# Patient Record
Sex: MALE | Race: WHITE | NOT HISPANIC OR LATINO | RURAL
[De-identification: names, ages, dates, MRNs, and addresses within clinical notes are randomized per-mention and may not be internally consistent; named-entity substitution may affect disease eponyms.]

---

## 2024-11-13 ENCOUNTER — HOSPITAL ENCOUNTER (EMERGENCY)
Facility: HOSPITAL | Age: 34
Discharge: LEFT BEFORE DISCHARGE | End: 2024-11-13
Payer: COMMERCIAL

## 2024-11-13 VITALS
SYSTOLIC BLOOD PRESSURE: 129 MMHG | HEART RATE: 76 BPM | BODY MASS INDEX: 26.41 KG/M2 | OXYGEN SATURATION: 97 % | WEIGHT: 184.5 LBS | DIASTOLIC BLOOD PRESSURE: 60 MMHG | HEIGHT: 70 IN | RESPIRATION RATE: 20 BRPM | TEMPERATURE: 98 F

## 2024-11-13 DIAGNOSIS — R55 SYNCOPAL EPISODES: ICD-10-CM

## 2024-11-13 PROCEDURE — 99281 EMR DPT VST MAYX REQ PHY/QHP: CPT

## 2024-11-13 NOTE — LETTER
Patient: Wilver Hassan  YOB: 1990  Date: 11/13/2024 Time: 6:12 PM  Location: [facility]    Leaving the Hospital Against Medical Advice    Chart #:[id number]    This will certify that I, the undersigned,    ______________________________________________________________________    A patient in the above named medical center, having requested discharge and removal from the medical [place] against the advice of my attending physician(s), hereby release [facility], its physicians, officers and employees, severally and individually, from any and all liability of any nature whatsoever for any injury or harm or complication of any kind that may result directly or indirectly, by reason of my terminating my stay as a patient at [facility] and my departure from [facility], and hereby waive any and all rights of action I may now have or later acquire as a result of my voluntary departure from [facility] and the termination of my stay as a patient therein.    This release is made with the full knowledge of the danger that may result from the action which I am taking.      Date:_______________________                         ___________________________                                                                                    Patient/Legal Representative    Witness:        ____________________________                          ___________________________  Nurse                                                                        Physician

## 2024-11-14 ENCOUNTER — TELEPHONE (OUTPATIENT)
Dept: EMERGENCY MEDICINE | Facility: HOSPITAL | Age: 34
End: 2024-11-14
Payer: COMMERCIAL

## 2024-11-14 NOTE — ED NOTES
WENT INTO PATIENTS ROOM TO OBTAIN BLOODWORK AND URINE/SWABS PER PROVIDER REQUEST. PATIENT HAD JUST ARRIVED BACK FROM CT WHERE [employer] INFORMS ME HE HAD REFUSED CT HEAD BECAUSE HE DID NOT WANT TO BE CHARGED FOR THIS. HE REPORTS HE KNOWS WHY HE FELL ASLEEP AND DOES NOT WANT WORK UP DONE. INFORMED OF LABS, UA, SWABS, EKG ORDERED. PATIENT DENIED ALL TESTING. INFORMED PATIENT I WOULD INFORM PROVIDER OF DECISION. SPOKE TO NERI PALOMINO AND INFORMED OF REFUSAL AND DECISION FOR AMA WAS MADE. PATIENT PROVIDED AMA PAPERWORK AND DISCUSSED RISKS OF LEAVING AGAINST MEDICAL ADVICE. PATIENT VERBALIZED UNDERSTANDING, SIGNED AMA AND LEFT DEPARTMENT AT THIS TIME

## 2024-11-14 NOTE — ED PROVIDER NOTES
Encounter Date: 11/13/2024       History     Chief Complaint   Patient presents with    Loss of Consciousness     Pt states that he passed out In his vehicle and the police told him he either needed to come to the ER or he was going to detention     34-year-old male presents to ED with complaint of loss of consciousness.  Patient states that he was passed out in his vehicle due to being up prolonged period of time and driving from [place] to [place], [place] and back to [place] without sleep.  Patient states that he used heroin on today and reports that he has regular use of heroin but is attempting to decrease issues.  Patient states police came to his vehicle and stated he was either going to detention or to the hospital to be evaluated.  Patient states that he has taken off in December to go to rehab to help with detox.  Patient denies chest pain, shortness of breathe, weakness, dizziness.    The history is provided by the patient.     Review of patient's allergies indicates:  No Known Allergies  No past medical history on file.  No past surgical history on file.  No family history on file.     Review of Systems   Constitutional:  Negative for chills and fever.   HENT:  Negative for sinus pressure and sinus pain.    Eyes:  Negative for photophobia and visual disturbance.   Respiratory:  Negative for cough and shortness of breath.    Gastrointestinal:  Negative for nausea and vomiting.   Musculoskeletal:  Negative for arthralgias and gait problem.   Skin:  Negative for color change and wound.   Neurological:  Positive for dizziness and syncope.   Hematological:  Negative for adenopathy. Does not bruise/bleed easily.   Psychiatric/Behavioral:  Negative for agitation and confusion.    All other systems reviewed and are negative.      Physical Exam     Initial Vitals [11/13/24 1800]   BP Pulse Resp Temp SpO2   129/60 76 (!) 24 98.1 °F (36.7 °C) 97 %      MAP       --         Physical Exam    Nursing note and vitals  reviewed.  Constitutional: He appears well-developed and well-nourished.   HENT:   Head: Normocephalic and atraumatic.   Eyes: EOM are normal. Pupils are equal, round, and reactive to light.   Neck: Neck supple.   Normal range of motion.  Cardiovascular:  Normal rate and regular rhythm.           No murmur heard.  Pulmonary/Chest: He has no wheezes. He has no rhonchi.   Abdominal: Abdomen is soft. He exhibits no distension. There is no abdominal tenderness.   Musculoskeletal:         General: No tenderness or edema.      Cervical back: Normal range of motion and neck supple.     Lymphadenopathy:     He has no cervical adenopathy.   Neurological: He is alert and oriented to person, place, and time. No cranial nerve deficit or sensory deficit.   Skin: Skin is warm and dry. Capillary refill takes less than 2 seconds.   Psychiatric:   Tearful during examination.         Medical Screening Exam   See Full Note    ED Course   Procedures  Labs Reviewed - No data to display       Imaging Results    None          Medications - No data to display  Medical Decision Making  34-year-old male presents to ED with complaint of loss of consciousness.  Patient states that he was passed out in his vehicle due to being up prolonged period of time and driving from [place] to [place], [place] and back to [place] without sleep.  Patient states that he used heroin on today and reports that he has regular use of heroin but is attempting to decrease issues.  Patient states police came to his vehicle and stated he was either going to halfway or to the hospital to be evaluated.  Patient states that he has taken off in December to go to rehab to help with detox.  Patient denies chest pain, shortness of breathe, weakness, dizziness.        Patient reporting that he would like to find out why he had syncopal episode.  Labs, diagnostics ordered  Patient refused all labs and diagnostics.  Papers signed for discharge AMA                                       Clinical Impression:   Final diagnoses:  [R55] Syncopal episodes        ED Disposition Condition    Yazmin Motta, [facility]  11/13/24 [id number]

## 2024-11-14 NOTE — ED NOTES
The patient is clinically not intoxicated, free from distracting pain, appears to have intact insight, judgment and reason and in my clinical opinion has the capacity to make decisions.  The patient is also not under any duress to leave the hospital.  In this scenario, it would be battery to subject a patient to treatment against his/her will.  I have voiced my concerns for the patient's health given that a full treatment and evaluation had not occurred.  I have discussed the need for continued evaluation to determine if their symptoms are caused by a condition that present risk of death or morbidity.  Risks including but not limited to death, permanent disability, prolonged hospitalization, prolonged illness, were discussed.  I tried offering alternative options in hopes that the patient might be amenable to partial evaluation and treatment which would be medically beneficial to the patient, though the patient declined my options and insisted on leaving.  Because I have been unable to convince the patient to stay, I answered all of their questions about their condition and asked them to return to the ED as soon as possible to complete their evaluation, especially if their symptoms worsen or do not improve.  I emphasized that leaving against medical advice does not preclude returning here for further evaluation.  I asked the patient to return if they change their mind about the further evaluation and treatment.  I strongly encouraged the patient to return to this Emergency Department or any Emergency Department at any time, particularly with worsening symptoms.